# Patient Record
Sex: FEMALE | Race: WHITE | NOT HISPANIC OR LATINO | Employment: OTHER
[De-identification: names, ages, dates, MRNs, and addresses within clinical notes are randomized per-mention and may not be internally consistent; named-entity substitution may affect disease eponyms.]

---

## 2017-02-16 ENCOUNTER — PRIOR ORIGINAL RECORDS (OUTPATIENT)
Dept: OTHER | Age: 62
End: 2017-02-16

## 2017-04-14 ENCOUNTER — HOSPITAL (OUTPATIENT)
Dept: OTHER | Age: 62
End: 2017-04-14
Attending: INTERNAL MEDICINE

## 2017-04-14 LAB — SERVICE CMNT-IMP: NORMAL

## 2017-07-20 ENCOUNTER — PRIOR ORIGINAL RECORDS (OUTPATIENT)
Dept: OTHER | Age: 62
End: 2017-07-20

## 2017-07-28 ENCOUNTER — PRIOR ORIGINAL RECORDS (OUTPATIENT)
Dept: OTHER | Age: 62
End: 2017-07-28

## 2017-08-08 ENCOUNTER — PRIOR ORIGINAL RECORDS (OUTPATIENT)
Dept: OTHER | Age: 62
End: 2017-08-08

## 2017-08-14 ENCOUNTER — PRIOR ORIGINAL RECORDS (OUTPATIENT)
Dept: OTHER | Age: 62
End: 2017-08-14

## 2017-08-23 ENCOUNTER — PRIOR ORIGINAL RECORDS (OUTPATIENT)
Dept: OTHER | Age: 62
End: 2017-08-23

## 2017-09-18 ENCOUNTER — PRIOR ORIGINAL RECORDS (OUTPATIENT)
Dept: OTHER | Age: 62
End: 2017-09-18

## 2017-10-04 ENCOUNTER — PRIOR ORIGINAL RECORDS (OUTPATIENT)
Dept: OTHER | Age: 62
End: 2017-10-04

## 2017-10-11 ENCOUNTER — PRIOR ORIGINAL RECORDS (OUTPATIENT)
Dept: OTHER | Age: 62
End: 2017-10-11

## 2017-10-13 ENCOUNTER — HOSPITAL ENCOUNTER (OUTPATIENT)
Dept: MAMMOGRAPHY | Facility: HOSPITAL | Age: 62
Discharge: HOME | End: 2017-10-13
Attending: INTERNAL MEDICINE
Payer: COMMERCIAL

## 2017-10-13 DIAGNOSIS — R22.31 MASS OF AXILLA, RIGHT: ICD-10-CM

## 2017-10-13 DIAGNOSIS — Z85.3 HISTORY OF BREAST CANCER: ICD-10-CM

## 2017-10-13 DIAGNOSIS — Z08 ENCOUNTER FOR ROUTINE CANCER FOLLOW-UP: ICD-10-CM

## 2017-10-13 PROCEDURE — 76882 US LMTD JT/FCL EVL NVASC XTR: CPT | Performed by: INTERNAL MEDICINE

## 2017-10-18 ENCOUNTER — PRIOR ORIGINAL RECORDS (OUTPATIENT)
Dept: OTHER | Age: 62
End: 2017-10-18

## 2017-10-23 PROBLEM — D69.49 OTHER PRIMARY THROMBOCYTOPENIA (HCC): Status: ACTIVE | Noted: 2017-10-23

## 2017-10-23 PROBLEM — D47.3 IDIOPATHIC THROMBOCYTHEMIA (HCC): Status: ACTIVE | Noted: 2017-10-23

## 2017-10-26 ENCOUNTER — OFFICE VISIT (OUTPATIENT)
Dept: HEMATOLOGY/ONCOLOGY | Facility: HOSPITAL | Age: 62
End: 2017-10-26
Attending: INTERNAL MEDICINE
Payer: COMMERCIAL

## 2017-10-26 VITALS
OXYGEN SATURATION: 97 % | SYSTOLIC BLOOD PRESSURE: 133 MMHG | HEART RATE: 73 BPM | BODY MASS INDEX: 28.06 KG/M2 | RESPIRATION RATE: 18 BRPM | DIASTOLIC BLOOD PRESSURE: 82 MMHG | WEIGHT: 148.63 LBS | HEIGHT: 61 IN

## 2017-10-26 DIAGNOSIS — D47.3 IDIOPATHIC THROMBOCYTHEMIA (HCC): ICD-10-CM

## 2017-10-26 DIAGNOSIS — D69.49 OTHER PRIMARY THROMBOCYTOPENIA (HCC): Primary | ICD-10-CM

## 2017-10-26 PROCEDURE — 96365 THER/PROPH/DIAG IV INF INIT: CPT

## 2017-10-26 PROCEDURE — 96366 THER/PROPH/DIAG IV INF ADDON: CPT

## 2017-10-26 RX ORDER — ACETAMINOPHEN 325 MG/1
650 TABLET ORAL ONCE
Status: COMPLETED | OUTPATIENT
Start: 2017-10-26 | End: 2017-10-26

## 2017-10-26 RX ORDER — ACETAMINOPHEN 325 MG/1
650 TABLET ORAL AS NEEDED
Status: CANCELLED | OUTPATIENT
Start: 2017-10-26

## 2017-10-26 RX ORDER — DIPHENHYDRAMINE HCL 25 MG
25 CAPSULE ORAL ONCE
Status: DISCONTINUED | OUTPATIENT
Start: 2017-10-26 | End: 2017-10-26

## 2017-10-26 RX ORDER — DIPHENHYDRAMINE HYDROCHLORIDE 50 MG/ML
25 INJECTION INTRAMUSCULAR; INTRAVENOUS AS NEEDED
Status: CANCELLED | OUTPATIENT
Start: 2017-10-26

## 2017-10-26 RX ORDER — DIPHENHYDRAMINE HCL 25 MG
25 CAPSULE ORAL AS NEEDED
Status: CANCELLED | OUTPATIENT
Start: 2017-10-26

## 2017-10-26 RX ORDER — DIPHENHYDRAMINE HCL 25 MG
25 CAPSULE ORAL ONCE
Status: COMPLETED | OUTPATIENT
Start: 2017-10-26 | End: 2017-10-26

## 2017-10-26 RX ORDER — DIPHENHYDRAMINE HYDROCHLORIDE 50 MG/ML
50 INJECTION INTRAMUSCULAR; INTRAVENOUS AS NEEDED
Status: CANCELLED | OUTPATIENT
Start: 2017-10-26

## 2017-10-26 RX ADMIN — ACETAMINOPHEN 650 MG: 325 TABLET ORAL at 10:23:00

## 2017-10-26 RX ADMIN — DIPHENHYDRAMINE HCL 25 MG: 25 MG CAPSULE ORAL at 10:23:00

## 2017-10-26 NOTE — PROGRESS NOTES
Pt arrived for IVIG infusion x 2 days, pt states she has been receiving steroids to treat without benefits, pt alert and appears in nad, pt ambulates with steady gait, pt to leave IV in place for tomorrow's infusion,   Education Record    Learner:  Patient

## 2017-10-26 NOTE — PATIENT INSTRUCTIONS
Immune Globulin Injection  Brand Names: BIVIGAM, Carimune NF, Cuvitru, Flebogamma, Flebogamma DIF, GamaSTAN S/D, Gammagard, Gammagard S/D, Gammaked, Gammaplex, Gamunex-C, Hizentra, Octagam, Privigen  What is this medicine?   IMMUNE GLOBULIN (im MUNE GLOB What may interact with this medicine?   · aspirin and aspirin-like medicines  · cisplatin  · cyclosporine  · medicines for infection like acyclovir, adefovir, amphotericin B, bacitracin, cidofovir, foscarnet, ganciclovir, gentamicin, pentamidine, vancomycin This medicine is made from pooled blood donations of many different people. It may be possible to pass an infection in this medicine. However, the donors are screened for infections and all products are tested for HIV and hepatitis.  The medicine is treated

## 2017-10-27 ENCOUNTER — PRIOR ORIGINAL RECORDS (OUTPATIENT)
Dept: OTHER | Age: 62
End: 2017-10-27

## 2017-10-27 ENCOUNTER — OFFICE VISIT (OUTPATIENT)
Dept: HEMATOLOGY/ONCOLOGY | Facility: HOSPITAL | Age: 62
End: 2017-10-27
Attending: INTERNAL MEDICINE
Payer: COMMERCIAL

## 2017-10-27 VITALS
SYSTOLIC BLOOD PRESSURE: 158 MMHG | OXYGEN SATURATION: 98 % | HEART RATE: 74 BPM | DIASTOLIC BLOOD PRESSURE: 89 MMHG | BODY MASS INDEX: 27.68 KG/M2 | WEIGHT: 146.63 LBS | HEIGHT: 60.98 IN | RESPIRATION RATE: 18 BRPM

## 2017-10-27 DIAGNOSIS — D69.49 OTHER PRIMARY THROMBOCYTOPENIA (HCC): Primary | ICD-10-CM

## 2017-10-27 DIAGNOSIS — D47.3 IDIOPATHIC THROMBOCYTHEMIA (HCC): ICD-10-CM

## 2017-10-27 PROCEDURE — 96365 THER/PROPH/DIAG IV INF INIT: CPT

## 2017-10-27 PROCEDURE — 96366 THER/PROPH/DIAG IV INF ADDON: CPT

## 2017-10-27 PROCEDURE — 80048 BASIC METABOLIC PNL TOTAL CA: CPT | Performed by: INTERNAL MEDICINE

## 2017-10-27 PROCEDURE — 96374 THER/PROPH/DIAG INJ IV PUSH: CPT

## 2017-10-27 PROCEDURE — 85025 COMPLETE CBC W/AUTO DIFF WBC: CPT

## 2017-10-27 RX ORDER — KETOROLAC TROMETHAMINE 30 MG/ML
30 INJECTION, SOLUTION INTRAMUSCULAR; INTRAVENOUS ONCE
Status: COMPLETED | OUTPATIENT
Start: 2017-10-27 | End: 2017-10-27

## 2017-10-27 RX ORDER — ACETAMINOPHEN 325 MG/1
650 TABLET ORAL AS NEEDED
Status: CANCELLED | OUTPATIENT
Start: 2017-10-27

## 2017-10-27 RX ORDER — DIPHENHYDRAMINE HCL 25 MG
25 CAPSULE ORAL AS NEEDED
Status: CANCELLED | OUTPATIENT
Start: 2017-10-27

## 2017-10-27 RX ORDER — ACETAMINOPHEN 325 MG/1
650 TABLET ORAL AS NEEDED
Status: DISCONTINUED | OUTPATIENT
Start: 2017-10-27 | End: 2017-10-27

## 2017-10-27 RX ORDER — DIPHENHYDRAMINE HYDROCHLORIDE 50 MG/ML
50 INJECTION INTRAMUSCULAR; INTRAVENOUS AS NEEDED
Status: CANCELLED | OUTPATIENT
Start: 2017-10-27

## 2017-10-27 RX ORDER — DIPHENHYDRAMINE HYDROCHLORIDE 50 MG/ML
25 INJECTION INTRAMUSCULAR; INTRAVENOUS AS NEEDED
Status: CANCELLED | OUTPATIENT
Start: 2017-10-27

## 2017-10-27 RX ORDER — DIPHENHYDRAMINE HCL 25 MG
25 CAPSULE ORAL AS NEEDED
Status: DISCONTINUED | OUTPATIENT
Start: 2017-10-27 | End: 2017-10-27

## 2017-10-27 RX ADMIN — KETOROLAC TROMETHAMINE 30 MG: 30 INJECTION, SOLUTION INTRAMUSCULAR; INTRAVENOUS at 13:40:00

## 2017-10-27 RX ADMIN — ACETAMINOPHEN 650 MG: 325 TABLET ORAL at 08:10:00

## 2017-10-27 RX ADMIN — DIPHENHYDRAMINE HCL 25 MG: 25 MG CAPSULE ORAL at 08:10:00

## 2017-10-27 NOTE — PROGRESS NOTES
Education Record    Learner:  Patient    Disease / Diagnosis:Other primary thrombocytopenia     Barriers / Limitations:  None   Comments:    Method:  Brief focused   Comments:    General Topics:  Infection, Medication, Pain, Precautions, Procedure, Side ef

## 2017-10-27 NOTE — PATIENT INSTRUCTIONS
Please call [phone] for direction to the [facility] office to see Kym Gabriel on Monday @ 330 pm. Also stop by your pharmacy to  Tramadol 50 mg PO q 8 hrs for your headache.

## 2017-10-30 ENCOUNTER — PRIOR ORIGINAL RECORDS (OUTPATIENT)
Dept: OTHER | Age: 62
End: 2017-10-30

## 2017-11-04 ENCOUNTER — HOSPITAL ENCOUNTER (EMERGENCY)
Facility: HOSPITAL | Age: 62
Discharge: HOME | End: 2017-11-04
Attending: EMERGENCY MEDICINE
Payer: COMMERCIAL

## 2017-11-04 VITALS
SYSTOLIC BLOOD PRESSURE: 144 MMHG | TEMPERATURE: 97 F | RESPIRATION RATE: 21 BRPM | HEIGHT: 64 IN | OXYGEN SATURATION: 96 % | BODY MASS INDEX: 25.03 KG/M2 | HEART RATE: 85 BPM | WEIGHT: 146.63 LBS | DIASTOLIC BLOOD PRESSURE: 86 MMHG

## 2017-11-04 DIAGNOSIS — Z86.2 HISTORY OF ITP: ICD-10-CM

## 2017-11-04 DIAGNOSIS — D64.9 ANEMIA, UNSPECIFIED TYPE: Primary | ICD-10-CM

## 2017-11-04 PROCEDURE — 36415 COLL VENOUS BLD VENIPUNCTURE: CPT

## 2017-11-04 PROCEDURE — 99283 EMERGENCY DEPT VISIT LOW MDM: CPT

## 2017-11-04 PROCEDURE — 99284 EMERGENCY DEPT VISIT MOD MDM: CPT

## 2017-11-04 PROCEDURE — 85025 COMPLETE CBC W/AUTO DIFF WBC: CPT | Performed by: EMERGENCY MEDICINE

## 2017-11-04 RX ORDER — OMEPRAZOLE 20 MG/1
20 CAPSULE, DELAYED RELEASE ORAL
COMMUNITY

## 2017-11-04 NOTE — ED PROVIDER NOTES
Patient Seen in: [facility] Emergency Department    History   Patient presents with:  Abnormal Labs    Stated Complaint: fatigue and weakness with decreased hgb and platelets    HPI    19-XBLF-NEV female sent here from the urgent care due to report Mitesh Every ) 96.7 °F (35.9 °C)  Temp src: Temporal  SpO2: 98 %  O2 Device: None (Room air)    Current:/86   Pulse 85   Temp (!) 96.7 °F (35.9 °C) (Temporal)   Resp 21   Ht 162.6 cm (5' 4\")   Wt 66.5 kg   SpO2 96%   BMI 25.16 kg/m²         Physical Exam  Gene Please view results for these tests on the individual orders.    RAINBOW DRAW BLUE   RAINBOW DRAW LAVENDER   RAINBOW DRAW LIGHT GREEN   RAINBOW DRAW GOLD       ============================================================  ED Course  ---------------

## 2017-11-04 NOTE — ED INITIAL ASSESSMENT (HPI)
Pt presents to ER with dizziness and fatigue. Pt has been having her hemoglobin checked regularly due to it being low. Pt was seen at urgent care today and found hgb to be 9.4. No CP. No SOB. Pt is speaking clearly. Skin warm pale.  Respirations equal and n

## 2017-11-06 ENCOUNTER — HOSPITAL ENCOUNTER (OUTPATIENT)
Dept: CT IMAGING | Facility: HOSPITAL | Age: 62
Discharge: HOME | End: 2017-11-06
Attending: INTERNAL MEDICINE
Payer: COMMERCIAL

## 2017-11-06 DIAGNOSIS — Z17.1 ESTROGEN RECEPTOR NEGATIVE: ICD-10-CM

## 2017-11-06 DIAGNOSIS — C50.212 MALIGNANT NEOPLASM OF UPPER-INNER QUADRANT OF LEFT FEMALE BREAST (HCC): ICD-10-CM

## 2017-11-06 DIAGNOSIS — Z90.710 VAGINAL PAP SMEAR FOLLOWING HYSTERECTOMY FOR MALIGNANCY: ICD-10-CM

## 2017-11-06 DIAGNOSIS — Z08 VAGINAL PAP SMEAR FOLLOWING HYSTERECTOMY FOR MALIGNANCY: ICD-10-CM

## 2017-11-06 DIAGNOSIS — Z13.9 ENCOUNTER FOR SCREENING: ICD-10-CM

## 2017-11-06 PROCEDURE — 70470 CT HEAD/BRAIN W/O & W/DYE: CPT | Performed by: INTERNAL MEDICINE

## 2017-11-08 ENCOUNTER — PRIOR ORIGINAL RECORDS (OUTPATIENT)
Dept: OTHER | Age: 62
End: 2017-11-08

## 2017-12-31 ENCOUNTER — PRIOR ORIGINAL RECORDS (OUTPATIENT)
Dept: OTHER | Age: 62
End: 2017-12-31

## 2020-10-09 LAB
% SATURATION: 23 % (CALC) (ref 11–50)
ALBUMIN/GLOB SERPL: 0.7 (CALC) (ref 1–2.5)
ALBUMIN/GLOB SERPL: 1.1 (CALC) (ref 1–2.5)
ALBUMIN/GLOB SERPL: 1.5 (CALC) (ref 1–2.5)
ALBUMIN/GLOB SERPL: 1.8 (CALC) (ref 1–2.5)
ALBUMIN/GLOB SERPL: 1.9 (CALC) (ref 1–2.5)
ALBUMIN: 3.3 G/DL (ref 3.6–5.1)
ALBUMIN: 4.1 G/DL (ref 3.6–5.1)
ALBUMIN: 4.1 G/DL (ref 3.6–5.1)
ALBUMIN: 4.4 G/DL (ref 3.6–5.1)
ALBUMIN: 4.4 G/DL (ref 3.6–5.1)
ALKALINE PHOSPHATASE: 39 UNIT/L (ref 33–130)
ALKALINE PHOSPHATASE: 40 UNIT/L (ref 33–130)
ALKALINE PHOSPHATASE: 53 UNIT/L (ref 33–130)
ALKALINE PHOSPHATASE: 56 UNIT/L (ref 33–130)
ALKALINE PHOSPHATASE: 66 UNIT/L (ref 33–130)
ALT: 122 UNIT/L (ref 6–29)
ALT: 22 UNIT/L (ref 6–29)
ALT: 23 UNIT/L (ref 6–29)
ALT: 24 UNIT/L (ref 6–29)
ALT: 32 UNIT/L (ref 6–29)
ANA SCREEN: NEGATIVE
ANA SCREEN: NEGATIVE
AST: 100 UNIT/L (ref 10–35)
AST: 18 UNIT/L (ref 10–35)
AST: 21 UNIT/L (ref 10–35)
AST: 21 UNIT/L (ref 10–35)
AST: 44 UNIT/L (ref 10–35)
BASO%: 0 %
BASO%: 0.1 %
BASO%: 0.1 %
BASO%: 0.2 %
BASO%: 0.4 %
BASO%: 0.4 %
BASO: 0 10^3/UL
BASO: 0.02 10^3/UL
BILIRUBIN, TOTAL: 0.4 MG/DL (ref 0.2–1.2)
BILIRUBIN, TOTAL: 0.5 MG/DL (ref 0.2–1.2)
BILIRUBIN, TOTAL: 0.6 MG/DL (ref 0.2–1.2)
BILIRUBIN, TOTAL: 0.7 MG/DL (ref 0.2–1.2)
BILIRUBIN, TOTAL: 2.1 MG/DL (ref 0.2–1.2)
BUN/CREATININE RATIO: ABNORMAL (CALC) (ref 6–22)
BUN/CREATININE RATIO: NORMAL (CALC) (ref 6–22)
BUN/CREATININE RATIO: NORMAL (CALC) (ref 6–22)
CA 27.29: 13 UNIT/ML
CA 27.29: 16 UNIT/ML
CALCIUM: 8.1 MG/DL
CALCIUM: 8.5 MG/DL (ref 8.6–10.4)
CALCIUM: 8.9 MG/DL (ref 8.6–10.4)
CALCIUM: 9.4 MG/DL (ref 8.6–10.4)
CALCIUM: 9.4 MG/DL (ref 8.6–10.4)
CALCIUM: 9.6 MG/DL (ref 8.6–10.4)
CARBON DIOXIDE: 22 MMOL/L (ref 20–31)
CARBON DIOXIDE: 24 MMOL/L (ref 20–31)
CARBON DIOXIDE: 25 MMOL/L (ref 20–31)
CARBON DIOXIDE: 26 MMOL/L (ref 20–31)
CARBON DIOXIDE: 27 MMOL/L
CARBON DIOXIDE: 27 MMOL/L (ref 20–31)
CHLORIDE: 100 MMOL/L (ref 98–110)
CHLORIDE: 100 MMOL/L (ref 98–110)
CHLORIDE: 103 MMOL/L
CHLORIDE: 103 MMOL/L (ref 98–110)
CHLORIDE: 98 MMOL/L (ref 98–110)
CHLORIDE: 98 MMOL/L (ref 98–110)
CRCL (C&G) (MOSAIQ HL): 71.44 ML/MIN
CRCL (C&G) (MOSAIQ HL): 80.84 ML/MIN
CRCL (C&G) (MOSAIQ HL): 83.27 ML/MIN
CRCL (C&G) (MOSAIQ HL): 84.16 ML/MIN
CRCL (C&G) (MOSAIQ HL): 90.47 ML/MIN
CRCL (C&G) (MOSAIQ HL): 94.52 ML/MIN
CREATININE CLEARANCE (MOSAIQ HL): 66.6 ML/MIN
CREATININE CLEARANCE (MOSAIQ HL): 75.3 ML/MIN
CREATININE CLEARANCE (MOSAIQ HL): 75.3 ML/MIN
CREATININE CLEARANCE (MOSAIQ HL): 78.4 ML/MIN
CREATININE CLEARANCE (MOSAIQ HL): 85.4 ML/MIN
CREATININE CLEARANCE (MOSAIQ HL): 88.1 ML/MIN
CREATININE: 0.65 MG/DL (ref 0.5–0.99)
CREATININE: 0.67 MG/DL (ref 0.5–0.99)
CREATININE: 0.73 MG/DL
CREATININE: 0.76 MG/DL (ref 0.5–0.99)
CREATININE: 0.77 MG/DL (ref 0.5–0.99)
CREATININE: 0.86 MG/DL (ref 0.5–0.99)
EGFR AFRICAN AMERICAN: 108 ML/MIN/1.73M2
EGFR AFRICAN AMERICAN: 110 ML/MIN/1.73M2
EGFR AFRICAN AMERICAN: 83 ML/MIN/1.73M2
EGFR AFRICAN AMERICAN: 96 ML/MIN/1.73M2
EGFR AFRICAN AMERICAN: 97 ML/MIN/1.73M2
EGFR NON-AFR. AMERICAN: 72 ML/MIN/1.73M2
EGFR NON-AFR. AMERICAN: 83 ML/MIN/1.73M2
EGFR NON-AFR. AMERICAN: 83 ML/MIN/1.73M2
EGFR NON-AFR. AMERICAN: 94 ML/MIN/1.73M2
EGFR NON-AFR. AMERICAN: 94 ML/MIN/1.73M2
EOS%: 0 %
EOS%: 0.1 %
EOS%: 0.2 %
EOS%: 0.6 %
EOS%: 0.7 %
EOS%: 0.8 %
EOS%: 1.3 %
EOS%: 1.5 %
EOS: 0 10^3/UL
EOS: 0.07 10^3/UL
EOS: 0.1 10^3/UL
EOS: 0.1 10^3/UL
FOLATE, SERUM: 16.1 NG/ML
GLOBULIN: 2.2 G/DL (CALC) (ref 1.9–3.7)
GLOBULIN: 2.4 G/DL (CALC) (ref 1.9–3.7)
GLOBULIN: 2.9 G/DL (CALC) (ref 1.9–3.7)
GLOBULIN: 3.9 G/DL (CALC) (ref 1.9–3.7)
GLOBULIN: 4.6 G/DL (CALC) (ref 1.9–3.7)
GLUCOSE: 105 MG/DL (ref 65–99)
GLUCOSE: 131 MG/DL (ref 65–99)
GLUCOSE: 83 MG/DL (ref 65–99)
GLUCOSE: 90 MG/DL (ref 65–99)
GLUCOSE: 93 MG/DL
GLUCOSE: 96 MG/DL (ref 65–99)
HCT: 23.9 % (ref 38–54)
HCT: 29.9 % (ref 38–54)
HCT: 32 % (ref 38–54)
HCT: 32.7 % (ref 38–54)
HCT: 32.9 %
HCT: 33.7 % (ref 38–54)
HCT: 34.9 % (ref 38–54)
HCT: 37.7 % (ref 38–54)
HCT: 38.6 %
HCT: 39.9 %
HGB: 10.5 G/DL (ref 12–18)
HGB: 11.7 G/DL (ref 12–18)
HGB: 12.3 G/DL
HGB: 12.3 G/DL (ref 12–18)
HGB: 12.7 G/DL (ref 12–18)
HGB: 12.8 G/DL (ref 12–18)
HGB: 13.7 G/DL (ref 12–18)
HGB: 13.9 G/DL
HGB: 14 G/DL
HGB: 9.1 G/DL (ref 12–18)
INTERNATIONAL NORMALIZED$RATIO: NORMAL
IRON BINDING CAPACITY: 309 MCG/DL (CALC) (ref 250–450)
IRON, TOTAL: 72 MCG/DL (ref 45–160)
LD: 269 UNIT/L (ref 120–250)
LD: 387 UNIT/L (ref 120–250)
LYMPH%: 12.3 % (ref 12–44)
LYMPH%: 13 %
LYMPH%: 14.6 % (ref 12–44)
LYMPH%: 19 %
LYMPH%: 22.1 % (ref 12–44)
LYMPH%: 23.5 %
LYMPH%: 24.1 % (ref 12–44)
LYMPH%: 30.4 % (ref 12–44)
LYMPH%: 32 % (ref 12–44)
LYMPH%: 33 % (ref 12–44)
LYMPH: 1.3 10^3/UL (ref 0.8–2.8)
LYMPH: 1.31 10^3/UL
LYMPH: 1.4 10^3/UL
LYMPH: 1.4 10^3/UL (ref 0.8–2.8)
LYMPH: 1.4 10^3/UL (ref 0.8–2.8)
LYMPH: 1.7 10^3/UL (ref 0.8–2.8)
LYMPH: 2 10^3/UL (ref 0.8–2.8)
LYMPH: 2.1 10^3/UL (ref 0.8–2.8)
LYMPH: 2.2 10^3/UL
LYMPH: 2.5 10^3/UL (ref 0.8–2.8)
Lab: NORMAL MG/DL
MAGNESIUM: 2.4 MG/DL (ref 1.5–2.5)
MCH: 33.1 PG
MCH: 33.7 PG
MCH: 33.8 PG (ref 26–33)
MCH: 34 PG (ref 26–33)
MCH: 34.2 PG (ref 26–33)
MCH: 34.3 PG (ref 26–33)
MCH: 34.8 PG
MCH: 34.8 PG (ref 26–33)
MCHC: 34.8 G/DL
MCHC: 35.1 G/DL (ref 31–36)
MCHC: 36.3 G/DL
MCHC: 36.3 G/DL (ref 31–36)
MCHC: 36.6 G/DL (ref 31–36)
MCHC: 36.7 G/DL (ref 31–36)
MCHC: 37.4 G/DL
MCHC: 37.6 G/DL (ref 31–36)
MCHC: 37.7 G/DL (ref 31–36)
MCHC: 38.1 G/DL (ref 31–36)
MCV: 88.8 FML (ref 82–100)
MCV: 90.8 FML (ref 82–100)
MCV: 91.1 FML (ref 82–100)
MCV: 92.5 FML (ref 82–100)
MCV: 92.8 FML
MCV: 92.8 FML (ref 82–100)
MCV: 93.1 FML (ref 82–100)
MCV: 93.2 FML
MCV: 95 FML
MCV: 99 FML (ref 82–100)
MONO%: 11 % (ref 2–12)
MONO%: 21.8 % (ref 2–12)
MONO%: 4.3 % (ref 2–12)
MONO%: 4.5 % (ref 2–12)
MONO%: 5 %
MONO%: 6 %
MONO%: 6 % (ref 2–12)
MONO%: 7.9 % (ref 2–12)
MONO%: 8.9 % (ref 2–12)
MONO%: 9.9 %
MONO: 0.4 10^3/UL (ref 0.2–1)
MONO: 0.4 10^3/UL (ref 0.2–1)
MONO: 0.5 10^3/UL (ref 0.2–1)
MONO: 0.5 10^3/UL (ref 0.2–1)
MONO: 0.55 10^3/UL
MONO: 0.6 10^3/UL
MONO: 0.6 10^3/UL
MONO: 0.6 10^3/UL (ref 0.2–1)
MONO: 0.8 10^3/UL (ref 0.2–1)
MONO: 1.7 10^3/UL (ref 0.2–1)
MPV: 13 FML (ref 8.6–11.7)
NEUT%: 45 % (ref 47–76)
NEUT%: 57 % (ref 47–76)
NEUT%: 57.2 % (ref 47–76)
NEUT%: 64 %
NEUT%: 67.3 % (ref 47–76)
NEUT%: 71.5 % (ref 47–76)
NEUT%: 76 %
NEUT%: 80.8 % (ref 47–76)
NEUT%: 81 %
NEUT%: 83.2 % (ref 47–76)
NEUT: 3.4 10^3/UL (ref 1.5–7.1)
NEUT: 3.5 10^3/UL (ref 1.5–7.1)
NEUT: 3.9 10^3/UL (ref 1.5–7.1)
NEUT: 3584 10^3/UL
NEUT: 4.5 10^3/UL (ref 1.5–7.1)
NEUT: 4.9 10^3/UL (ref 1.5–7.1)
NEUT: 7.3 10^3/UL (ref 1.5–7.1)
NEUT: 8.8 10^3/UL
NEUT: 8.8 10^3/UL
NEUT: 9.7 10^3/UL (ref 1.5–7.1)
PLT: 124 10^3/UL
PLT: 16 10^3/UL (ref 150–375)
PLT: 19 10^3/UL (ref 150–375)
PLT: 24 10^3/UL (ref 150–375)
PLT: 25 10^3/UL (ref 150–375)
PLT: 26 10^3/UL (ref 150–375)
PLT: 30 10^3/UL (ref 150–375)
PLT: 31 10^3/UL (ref 150–375)
PLT: 53 10^3/UL
PLT: 71 10^3/UL
PLT: 90 10^3/UL
PLT: 97 10^3/UL
PLT: 99 10^3/UL
POTASSIUM: 3.8 MMOL/L (ref 3.5–5.3)
POTASSIUM: 4 MMOL/L (ref 3.5–5.3)
POTASSIUM: 4 MMOL/L (ref 3.5–5.3)
POTASSIUM: 4.3 MMOL/L
POTASSIUM: 4.4 MMOL/L (ref 3.5–5.3)
POTASSIUM: 4.4 MMOL/L (ref 3.5–5.3)
PROTEIN, TOTAL: 6.3 G/DL (ref 6.1–8.1)
PROTEIN, TOTAL: 6.8 G/DL (ref 6.1–8.1)
PROTEIN, TOTAL: 7.3 G/DL (ref 6.1–8.1)
PROTEIN, TOTAL: 7.9 G/DL (ref 6.1–8.1)
PROTEIN, TOTAL: 8 G/DL (ref 6.1–8.1)
PTT: NORMAL SEC
RBC: 2.69 10^6/UL (ref 4.2–6.2)
RBC: 3.02 10^6/UL (ref 4.2–6.2)
RBC: 3.46 10^6/UL (ref 4.2–6.2)
RBC: 3.53 10^6/UL
RBC: 3.6 10^6/UL (ref 4.2–6.2)
RBC: 3.7 10^6/UL (ref 4.2–6.2)
RBC: 3.76 10^6/UL (ref 4.2–6.2)
RBC: 4.05 10^6/UL (ref 4.2–6.2)
RBC: 4.16 10^6/UL
RBC: 4.2 10^6/UL
RDW-CV: 12.4 %
RDW-CV: 12.5 %
RDW-CV: 12.6 %
RDW-CV: 12.6 %
RDW-CV: 12.8 %
RDW-CV: 13 %
RDW-CV: 18.3 %
RDW-SD: 39.8 FML (ref 36–50)
RDW-SD: 39.8 FML (ref 36–50)
RDW-SD: 40.3 FML (ref 36–50)
RDW-SD: 41 FML (ref 36–50)
RDW-SD: 41.2 FML (ref 36–50)
RDW-SD: 41.3 FML (ref 36–50)
RDW-SD: 60.7 FML (ref 36–50)
RETICULOCYTE COUNT,$AUTOMATED: 3.9 %
RETICULOCYTE, ABSOLUTE: ABNORMAL CELLS/UL (ref 20000–80000)
SODIUM: 133 MMOL/L (ref 135–146)
SODIUM: 134 MMOL/L
SODIUM: 135 MMOL/L (ref 135–146)
SODIUM: 135 MMOL/L (ref 135–146)
SODIUM: 137 MMOL/L (ref 135–146)
SODIUM: 137 MMOL/L (ref 135–146)
UREA NITROGEN (BUN): 17 MG/DL (ref 7–25)
UREA NITROGEN (BUN): 17 MG/DL (ref 7–25)
UREA NITROGEN (BUN): 18 MG/DL
UREA NITROGEN (BUN): 18 MG/DL (ref 7–25)
UREA NITROGEN (BUN): 19 MG/DL (ref 7–25)
UREA NITROGEN (BUN): 24 MG/DL (ref 7–25)
VITAMIN B12: 756 PG/ML (ref 200–1100)
WBC: 10.8 10^3/UL
WBC: 11.6 10^3/UL
WBC: 11.6 10^3/UL (ref 4.3–11)
WBC: 5.6 10^3/UL
WBC: 6 10^3/UL (ref 4.3–11)
WBC: 6.3 10^3/UL (ref 4.3–11)
WBC: 6.9 10^3/UL (ref 4.3–11)
WBC: 7.2 10^3/UL (ref 4.3–11)
WBC: 7.8 10^3/UL (ref 4.3–11)
WBC: 9 10^3/UL (ref 4.3–11)

## 2020-10-11 VITALS — DIASTOLIC BLOOD PRESSURE: 82 MMHG | SYSTOLIC BLOOD PRESSURE: 128 MMHG | WEIGHT: 147 LBS

## 2020-10-11 VITALS
DIASTOLIC BLOOD PRESSURE: 86 MMHG | BODY MASS INDEX: 28.98 KG/M2 | SYSTOLIC BLOOD PRESSURE: 142 MMHG | HEIGHT: 61 IN | WEIGHT: 153.51 LBS

## 2020-10-11 VITALS — SYSTOLIC BLOOD PRESSURE: 130 MMHG | DIASTOLIC BLOOD PRESSURE: 76 MMHG | WEIGHT: 149.01 LBS

## 2020-10-11 VITALS — DIASTOLIC BLOOD PRESSURE: 78 MMHG | WEIGHT: 147 LBS | SYSTOLIC BLOOD PRESSURE: 126 MMHG

## 2020-10-11 VITALS — WEIGHT: 149.01 LBS | SYSTOLIC BLOOD PRESSURE: 128 MMHG | DIASTOLIC BLOOD PRESSURE: 70 MMHG

## 2021-04-17 ENCOUNTER — TELEPHONE (OUTPATIENT)
Dept: SCHEDULING | Age: 66
End: 2021-04-17

## 2021-04-17 DIAGNOSIS — Z91.89 AT RISK FOR CORONARY ARTERY DISEASE: Primary | ICD-10-CM

## 2021-05-10 ENCOUNTER — IMAGING SERVICES (OUTPATIENT)
Dept: CT IMAGING | Age: 66
End: 2021-05-10

## 2021-05-10 DIAGNOSIS — Z91.89 AT RISK FOR CORONARY ARTERY DISEASE: ICD-10-CM

## 2021-05-10 PROCEDURE — 75571 CT HRT W/O DYE W/CA TEST: CPT | Performed by: RADIOLOGY

## (undated) NOTE — ED AVS SNAPSHOT
Madeline Jonas   MRN: [id number]    Department:  [facility] Emergency Department   Date of Visit:  11/4/2017           Disclosure     Insurance plans vary and the physician(s) referred by the ER may not be covered by your plan.  Please contact If you have been prescribed any medication(s), please fill your prescription right away and begin taking the medication(s) as directed    If the emergency physician has read X-rays, these will be re-interpreted by a radiologist.  If there is a significant